# Patient Record
Sex: FEMALE | Race: WHITE | NOT HISPANIC OR LATINO | Employment: FULL TIME | URBAN - METROPOLITAN AREA
[De-identification: names, ages, dates, MRNs, and addresses within clinical notes are randomized per-mention and may not be internally consistent; named-entity substitution may affect disease eponyms.]

---

## 2025-02-16 ENCOUNTER — APPOINTMENT (OUTPATIENT)
Dept: RADIOLOGY | Facility: HOSPITAL | Age: 40
End: 2025-02-16

## 2025-02-16 ENCOUNTER — HOSPITAL ENCOUNTER (EMERGENCY)
Facility: HOSPITAL | Age: 40
Discharge: HOME | End: 2025-02-16
Attending: EMERGENCY MEDICINE

## 2025-02-16 VITALS
BODY MASS INDEX: 21 KG/M2 | OXYGEN SATURATION: 99 % | WEIGHT: 123 LBS | DIASTOLIC BLOOD PRESSURE: 80 MMHG | RESPIRATION RATE: 16 BRPM | TEMPERATURE: 97.3 F | SYSTOLIC BLOOD PRESSURE: 107 MMHG | HEIGHT: 64 IN | HEART RATE: 90 BPM

## 2025-02-16 DIAGNOSIS — J03.90 TONSILLITIS: Primary | ICD-10-CM

## 2025-02-16 LAB
ALBUMIN SERPL BCP-MCNC: 4.2 G/DL (ref 3.4–5)
ALP SERPL-CCNC: 54 U/L (ref 33–110)
ALT SERPL W P-5'-P-CCNC: 9 U/L (ref 7–45)
ANION GAP SERPL CALC-SCNC: 12 MMOL/L (ref 10–20)
AST SERPL W P-5'-P-CCNC: 11 U/L (ref 9–39)
BACTERIA BLD CULT: NORMAL
BACTERIA BLD CULT: NORMAL
BASOPHILS # BLD AUTO: 0.04 X10*3/UL (ref 0–0.1)
BASOPHILS NFR BLD AUTO: 0.2 %
BILIRUB SERPL-MCNC: 0.8 MG/DL (ref 0–1.2)
BUN SERPL-MCNC: 11 MG/DL (ref 6–23)
CALCIUM SERPL-MCNC: 9.3 MG/DL (ref 8.6–10.3)
CHLORIDE SERPL-SCNC: 103 MMOL/L (ref 98–107)
CO2 SERPL-SCNC: 26 MMOL/L (ref 21–32)
CREAT SERPL-MCNC: 0.64 MG/DL (ref 0.5–1.05)
CRP SERPL-MCNC: 21.91 MG/DL
EGFRCR SERPLBLD CKD-EPI 2021: >90 ML/MIN/1.73M*2
EOSINOPHIL # BLD AUTO: 0.07 X10*3/UL (ref 0–0.7)
EOSINOPHIL NFR BLD AUTO: 0.4 %
ERYTHROCYTE [DISTWIDTH] IN BLOOD BY AUTOMATED COUNT: 12 % (ref 11.5–14.5)
FLUAV RNA RESP QL NAA+PROBE: NOT DETECTED
FLUBV RNA RESP QL NAA+PROBE: NOT DETECTED
GLUCOSE SERPL-MCNC: 94 MG/DL (ref 74–99)
HCT VFR BLD AUTO: 40.4 % (ref 36–46)
HGB BLD-MCNC: 13.3 G/DL (ref 12–16)
IMM GRANULOCYTES # BLD AUTO: 0.08 X10*3/UL (ref 0–0.7)
IMM GRANULOCYTES NFR BLD AUTO: 0.5 % (ref 0–0.9)
LACTATE SERPL-SCNC: 0.7 MMOL/L (ref 0.4–2)
LYMPHOCYTES # BLD AUTO: 0.86 X10*3/UL (ref 1.2–4.8)
LYMPHOCYTES NFR BLD AUTO: 5.3 %
MCH RBC QN AUTO: 31.4 PG (ref 26–34)
MCHC RBC AUTO-ENTMCNC: 32.9 G/DL (ref 32–36)
MCV RBC AUTO: 96 FL (ref 80–100)
MONOCYTES # BLD AUTO: 1.27 X10*3/UL (ref 0.1–1)
MONOCYTES NFR BLD AUTO: 7.8 %
MONONUCLEOSIS SCREEN: NEGATIVE
NEUTROPHILS # BLD AUTO: 13.88 X10*3/UL (ref 1.2–7.7)
NEUTROPHILS NFR BLD AUTO: 85.8 %
NRBC BLD-RTO: 0 /100 WBCS (ref 0–0)
PLATELET # BLD AUTO: 321 X10*3/UL (ref 150–450)
POTASSIUM SERPL-SCNC: 3.7 MMOL/L (ref 3.5–5.3)
PROT SERPL-MCNC: 6.8 G/DL (ref 6.4–8.2)
RBC # BLD AUTO: 4.23 X10*6/UL (ref 4–5.2)
S PYO DNA THROAT QL NAA+PROBE: NOT DETECTED
SARS-COV-2 RNA RESP QL NAA+PROBE: NOT DETECTED
SODIUM SERPL-SCNC: 137 MMOL/L (ref 136–145)
WBC # BLD AUTO: 16.2 X10*3/UL (ref 4.4–11.3)

## 2025-02-16 PROCEDURE — 2500000004 HC RX 250 GENERAL PHARMACY W/ HCPCS (ALT 636 FOR OP/ED): Performed by: EMERGENCY MEDICINE

## 2025-02-16 PROCEDURE — 36415 COLL VENOUS BLD VENIPUNCTURE: CPT | Performed by: EMERGENCY MEDICINE

## 2025-02-16 PROCEDURE — 2500000005 HC RX 250 GENERAL PHARMACY W/O HCPCS: Performed by: EMERGENCY MEDICINE

## 2025-02-16 PROCEDURE — 99285 EMERGENCY DEPT VISIT HI MDM: CPT | Performed by: EMERGENCY MEDICINE

## 2025-02-16 PROCEDURE — 99222 1ST HOSP IP/OBS MODERATE 55: CPT | Performed by: OTOLARYNGOLOGY

## 2025-02-16 PROCEDURE — 96375 TX/PRO/DX INJ NEW DRUG ADDON: CPT | Mod: 59

## 2025-02-16 PROCEDURE — 83605 ASSAY OF LACTIC ACID: CPT | Performed by: EMERGENCY MEDICINE

## 2025-02-16 PROCEDURE — 86140 C-REACTIVE PROTEIN: CPT | Performed by: EMERGENCY MEDICINE

## 2025-02-16 PROCEDURE — 70491 CT SOFT TISSUE NECK W/DYE: CPT

## 2025-02-16 PROCEDURE — 96368 THER/DIAG CONCURRENT INF: CPT

## 2025-02-16 PROCEDURE — 2550000001 HC RX 255 CONTRASTS: Performed by: EMERGENCY MEDICINE

## 2025-02-16 PROCEDURE — 87040 BLOOD CULTURE FOR BACTERIA: CPT | Mod: STJLAB | Performed by: EMERGENCY MEDICINE

## 2025-02-16 PROCEDURE — 85025 COMPLETE CBC W/AUTO DIFF WBC: CPT | Performed by: EMERGENCY MEDICINE

## 2025-02-16 PROCEDURE — 80053 COMPREHEN METABOLIC PANEL: CPT | Performed by: EMERGENCY MEDICINE

## 2025-02-16 PROCEDURE — 87651 STREP A DNA AMP PROBE: CPT | Performed by: EMERGENCY MEDICINE

## 2025-02-16 PROCEDURE — 87636 SARSCOV2 & INF A&B AMP PRB: CPT | Performed by: EMERGENCY MEDICINE

## 2025-02-16 PROCEDURE — 96365 THER/PROPH/DIAG IV INF INIT: CPT | Mod: 59

## 2025-02-16 PROCEDURE — 70491 CT SOFT TISSUE NECK W/DYE: CPT | Performed by: RADIOLOGY

## 2025-02-16 PROCEDURE — 86308 HETEROPHILE ANTIBODY SCREEN: CPT | Performed by: EMERGENCY MEDICINE

## 2025-02-16 PROCEDURE — 96361 HYDRATE IV INFUSION ADD-ON: CPT

## 2025-02-16 RX ORDER — KETOROLAC TROMETHAMINE 15 MG/ML
15 INJECTION, SOLUTION INTRAMUSCULAR; INTRAVENOUS ONCE
Status: COMPLETED | OUTPATIENT
Start: 2025-02-16 | End: 2025-02-16

## 2025-02-16 RX ORDER — CLINDAMYCIN PHOSPHATE 900 MG/50ML
900 INJECTION, SOLUTION INTRAVENOUS ONCE
Status: COMPLETED | OUTPATIENT
Start: 2025-02-16 | End: 2025-02-16

## 2025-02-16 RX ORDER — LIDOCAINE HYDROCHLORIDE 20 MG/ML
15 SOLUTION OROPHARYNGEAL ONCE
Status: COMPLETED | OUTPATIENT
Start: 2025-02-16 | End: 2025-02-16

## 2025-02-16 RX ORDER — AMOXICILLIN AND CLAVULANATE POTASSIUM 875; 125 MG/1; MG/1
1 TABLET, FILM COATED ORAL EVERY 12 HOURS
Qty: 28 TABLET | Refills: 0 | Status: SHIPPED | OUTPATIENT
Start: 2025-02-16 | End: 2025-03-02

## 2025-02-16 RX ORDER — OXYCODONE HYDROCHLORIDE 5 MG/1
5 TABLET ORAL EVERY 6 HOURS PRN
Qty: 12 TABLET | Refills: 0 | Status: SHIPPED | OUTPATIENT
Start: 2025-02-16 | End: 2025-02-19

## 2025-02-16 RX ORDER — DEXTROAMPHETAMINE SACCHARATE, AMPHETAMINE ASPARTATE, DEXTROAMPHETAMINE SULFATE AND AMPHETAMINE SULFATE 5; 5; 5; 5 MG/1; MG/1; MG/1; MG/1
20 TABLET ORAL 2 TIMES DAILY
COMMUNITY

## 2025-02-16 RX ORDER — PREDNISONE 20 MG/1
TABLET ORAL
Qty: 15 TABLET | Refills: 0 | Status: SHIPPED | OUTPATIENT
Start: 2025-02-16 | End: 2025-02-28

## 2025-02-16 RX ADMIN — CLINDAMYCIN PHOSPHATE 900 MG: 900 INJECTION, SOLUTION INTRAVENOUS at 08:48

## 2025-02-16 RX ADMIN — LIDOCAINE HYDROCHLORIDE 15 ML: 20 SOLUTION ORAL at 09:45

## 2025-02-16 RX ADMIN — SODIUM CHLORIDE, POTASSIUM CHLORIDE, SODIUM LACTATE AND CALCIUM CHLORIDE 1000 ML: 600; 310; 30; 20 INJECTION, SOLUTION INTRAVENOUS at 08:49

## 2025-02-16 RX ADMIN — IOHEXOL 70 ML: 350 INJECTION, SOLUTION INTRAVENOUS at 08:27

## 2025-02-16 RX ADMIN — KETOROLAC TROMETHAMINE 15 MG: 15 INJECTION, SOLUTION INTRAMUSCULAR; INTRAVENOUS at 07:59

## 2025-02-16 RX ADMIN — DEXAMETHASONE SODIUM PHOSPHATE 20 MG: 10 INJECTION INTRAMUSCULAR; INTRAVENOUS at 08:49

## 2025-02-16 ASSESSMENT — PAIN SCALES - GENERAL
PAINLEVEL_OUTOF10: 2
PAINLEVEL_OUTOF10: 9

## 2025-02-16 ASSESSMENT — COLUMBIA-SUICIDE SEVERITY RATING SCALE - C-SSRS
6. HAVE YOU EVER DONE ANYTHING, STARTED TO DO ANYTHING, OR PREPARED TO DO ANYTHING TO END YOUR LIFE?: NO
1. IN THE PAST MONTH, HAVE YOU WISHED YOU WERE DEAD OR WISHED YOU COULD GO TO SLEEP AND NOT WAKE UP?: NO
2. HAVE YOU ACTUALLY HAD ANY THOUGHTS OF KILLING YOURSELF?: NO

## 2025-02-16 ASSESSMENT — PAIN DESCRIPTION - LOCATION
LOCATION: THROAT
LOCATION: THROAT

## 2025-02-16 ASSESSMENT — PAIN DESCRIPTION - PAIN TYPE: TYPE: ACUTE PAIN

## 2025-02-16 ASSESSMENT — PAIN - FUNCTIONAL ASSESSMENT
PAIN_FUNCTIONAL_ASSESSMENT: 0-10
PAIN_FUNCTIONAL_ASSESSMENT: 0-10

## 2025-02-16 ASSESSMENT — PAIN DESCRIPTION - DESCRIPTORS: DESCRIPTORS: SORE

## 2025-02-16 NOTE — ED PROVIDER NOTES
EMERGENCY DEPARTMENT ENCOUNTER      Pt Name: Eliana Lagunas  MRN: [id number]  Birthdate 1985  Date of evaluation: 2/16/2025  Provider: Mode Roland DO    CHIEF COMPLAINT       Chief Complaint   Patient presents with    Sore Throat     HISTORY OF PRESENT ILLNESS    Eliana Lagunas is a 40 y.o. year old female who presents to the ER for sore throat.  Reports that last week she was sneezing, Thursday started to have left-sided tonsil pain.  Thursday went to the dentist and had a post placed on the crown of one of her right upper incisors.  Has noticed increased swelling to the left side of her neck, endorses decreased appetite, pain swallowing, difficulty swallowing, nausea without vomiting, decreased urinary frequency with darkened urine.  She does report feeling like there is something underneath her tongue, and feels that she cannot open her mouth completely.  Also does report dysphonia.  Does endorse that she has been sweating frequently, feeling alternating hot and cold, however she has been taking Tylenol and ibuprofen alternating around-the-clock.  States that she has only voided once or twice per day for the last couple days.  Denies objective fever, chest pain, shortness of breath, abdominal pain.    PMH ADHD on Adderall, Ozempic, TRT for hot flashes  PSH tubal ligation, cholecystectomy  Allergies to sulfa  Endorses vaping use, alcohol use, denies drug use  PAST MEDICAL HISTORY   History reviewed. No pertinent past medical history.  CURRENT MEDICATIONS       Previous Medications    AMPHETAMINE-DEXTROAMPHETAMINE (ADDERALL) 20 MG TABLET    Take 1 tablet (20 mg) by mouth 2 times a day.     SURGICAL HISTORY     History reviewed. No pertinent surgical history.  ALLERGIES     Sulfamide  FAMILY HISTORY     No family history on file.  SOCIAL HISTORY       Social History     Tobacco Use    Smoking status: Never    Smokeless tobacco: Never   Vaping Use    Vaping status: Every Day    Substances:  Nicotine   Substance Use Topics    Alcohol use: Yes    Drug use: Never     PHYSICAL EXAM  (up to 7 for level 4, 8 or more for level 5)     ED Triage Vitals [02/16/25 0647]   Temperature Heart Rate Respirations BP   36.3 °C (97.3 °F) 100 18 118/69      Pulse Ox Temp Source Heart Rate Source Patient Position   100 % Temporal -- Sitting      BP Location FiO2 (%)     Right arm --       Physical Exam  Vitals and nursing note reviewed.   Constitutional:       General: She is not in acute distress.     Appearance: Normal appearance. She is not ill-appearing.   HENT:      Head: Normocephalic and atraumatic. No raccoon eyes, Newberry's sign, contusion or laceration.      Jaw: Trismus present. No malocclusion.      Comments: No edema floor of mouth     Right Ear: External ear normal.      Left Ear: External ear normal.      Mouth/Throat:      Mouth: Mucous membranes are moist. No oral lesions or angioedema.      Dentition: Normal dentition.      Pharynx: Uvula midline. Pharyngeal swelling, oropharyngeal exudate and posterior oropharyngeal erythema present. No uvula swelling.      Tonsils: Tonsillar exudate present.   Eyes:      Extraocular Movements: Extraocular movements intact.      Pupils: Pupils are equal, round, and reactive to light.   Neck:      Trachea: No tracheal deviation.      Comments: Pain with neck extension, no crepitus or fluctuance    No stridor, slight muffled phonation  Cardiovascular:      Rate and Rhythm: Normal rate and regular rhythm.      Pulses: Normal pulses.   Pulmonary:      Effort: No respiratory distress.      Breath sounds: No wheezing, rhonchi or rales.   Chest:      Chest wall: No tenderness.   Abdominal:      General: Abdomen is flat.      Palpations: Abdomen is soft. There is no mass.      Tenderness: There is no abdominal tenderness.   Musculoskeletal:         General: No tenderness or signs of injury.      Cervical back: Neck supple. No rigidity or crepitus. Pain with movement (extension)  "present. No muscular tenderness. Normal range of motion.      Right lower leg: No edema.      Left lower leg: No edema.   Lymphadenopathy:      Cervical: Cervical adenopathy present.      Left cervical: Posterior cervical adenopathy present.   Skin:     Coloration: Skin is not jaundiced or pale.      Findings: No petechiae, rash or wound.   Neurological:      Mental Status: She is alert.       DIAGNOSTIC RESULTS   LABS:  Labs Reviewed   GROUP A STREPTOCOCCUS, PCR - Normal       Result Value    Group A Strep PCR Not Detected     SARS-COV-2 AND INFLUENZA A/B PCR   CBC WITH AUTO DIFFERENTIAL   COMPREHENSIVE METABOLIC PANEL   C-REACTIVE PROTEIN   MONONUCLEOSIS SCREEN (HETEROPHILE ANTIBODY)     All other labs were within normal range or not returned as of this dictation.  Imaging  CT soft tissue neck w IV contrast    (Results Pending)      Procedure  Procedures  EMERGENCY DEPARTMENT COURSE/MDM:   Medical Decision Making    Vitals:    Vitals:    02/16/25 0647   BP: 118/69   BP Location: Right arm   Patient Position: Sitting   Pulse: 100   Resp: 18   Temp: 36.3 °C (97.3 °F)   TempSrc: Temporal   SpO2: 100%   Weight: 55.8 kg (123 lb)   Height: 1.62 m (5' 3.78\")     Eliana Lagunas is a female 40 y.o. who presents to the ER for ***. On arrival the patients vital signs were: {vital signs:38948}. History obtained from: {Historian:84475}.     EKG *** Rhythm: {CV DEVICE CHECK RHYTHM TYPE:38585} Ventricular rate: {Rate:60796} Intervals (-200 /QRS  /QT >450M or >470F): ME *** QRS *** Qtc *** Blocks: {heart blocks:11427} Potential signs of ischemia: {Signs of ischemia:08768} Mariscal-Modified Sgarbossa Criteria (if pacemaker or LBBB present):{modified sgarbossa:65866}    ED Course as of 02/16/25 0750   Sun Feb 16, 2025   0730 Group A Strep PCR: Not Detected [CB]      ED Course User Index  [CB] Mode Roland DO       Consultant discussions: ***  Diagnostic testing considered but not performed: ***  External " Records Reviewed: I reviewed recent and relevant outside records including inpatient notes, outpatient records  Prescription Drug Consideration: ***    Shared decision making for disposition  Patient and/or patient´s representative was counseled regarding labs, imaging, likely diagnosis. All questions were answered. Recommendation was made   for {Dispo:81743}    ED Medications administered this visit:    Medications   ketorolac (Toradol) injection 15 mg (has no administration in time range)   dexAMETHasone (Decadron) 20 mg in dextrose 5% 50 mL IV (has no administration in time range)       New Prescriptions from this visit:    New Prescriptions    No medications on file       Follow-up:  No follow-up provider specified.      Final Impression: No diagnosis found.      Please excuse any misspellings or unintended errors related to the Dragon speech recognition software used to dictate this note.    I reviewed the case with the attending ED physician. The attending ED physician agrees with the plan.      Procedure  Procedures  EMERGENCY DEPARTMENT COURSE/MDM:   Medical Decision Making    Vitals:    Vitals:    02/16/25 0930 02/16/25 0945 02/16/25 1000 02/16/25 1030   BP: 102/63  100/66 107/80   BP Location:       Patient Position:       Pulse:       Resp:       Temp:       TempSrc:       SpO2: 100% 100% 100% 99%   Weight:       Height:         Eliana Lagunas is a female 40 y.o. who presents to the ER for sore throat with associated neck swelling, pain with neck extension, dysphonia, trismus. On arrival the patients vital signs were: Afebrile, Tachycardic, Normotensive, Regular RR, and Normoxic on room air. History obtained from: patient.  No elevated floor of mouth, no stridor, dyspnea protecting her airway, does not appear to be Rinku's angina, emergent intubation not indicated.  Given trismus, dysphonia, tachycardia plan for CBC, CMP, CRP, Monospot, viral swabs, strep swab, CT soft tissue neck with IV contrast.  Will provide Decadron for anti-inflammation as well as Toradol and Xylocaine for analgesia..    ED Course as of 02/17/25 1056   Sun Feb 16, 2025   0730 Group A Strep PCR: Not Detected [CB]   0813 CBC and Auto Differential(!)  Leukocytosis with left shift, otherwise no acute leukocytosis, leukopenia, thrombocytosis, thrombocytopenia [CB]   0815 Given leukocytosis, heart rate greater than 90, neck infection will provide 1 L LR for rehydration, clindamycin for empiric intraoral antibiosis, escalate to septic workup including lactate, blood cultures [CB]   0824 C-Reactive Protein(!): 21.91  Significantly elevated [CB]   0825 Mononucleosis Screen: Negative [CB]   0825 Comprehensive metabolic panel  Normoglycemic, no acute electrolyte or hepatorenal abnormality [CB]   0825 Group A Strep PCR: Not Detected [CB]   0825 Coronavirus 2019, PCR: Not Detected [CB]   0825 Flu B Result: Not Detected [CB]   0825 Flu A Result: Not Detected [CB]   0912 CT soft tissue neck w IV contrast  Characteristic findings of  acute tonsillitis with moderate-to-marked  enlargement of the bilateral palatine tonsils and moderate  enlargement of the lingual tonsils bilaterally. Tonsillar enlargement  results in an element of transverse narrowing of the oropharyngeal  airway with airway patency. There is questionable subtle tiny  phlegmonous component along the inferior margin of the left palatine  tonsil with no organized or drainable fluid collection evident. Soft  tissue inflammation extends to the left parapharyngeal fat and left  submandibular space.      Enlarged, reactive appearing bilateral cervical lymph nodes. [CB]   1000 On reassessment normotensive, heart rate improved [CB]   1017 Discussed with ENT, Dr. Kang, who acknowledged the patient could either be admitted or discharged based off of her current condition.  If discharged, would send home with Augmentin and prednisone taper.  Discussed with patient, she prefers to go home if possible.  Plan for p.o. challenge for disposition. If fails PO, will admit on unsayn. [CB]   1049 Patient reevaluated.  Patient tolerating oral intake.  She wants to try outpatient management with I think is completely reasonable at this time.  Will start her on Augmentin and continued steroids. [JJ]      ED Course User Index  [CB] Mode Roland DO  [JJ] Andrea Morales DO         Diagnoses as of 02/17/25 1056   Tonsillitis     External Records Reviewed: I reviewed recent and relevant outside records including inpatient notes, outpatient records  Prescription Drug Consideration: Augmentin prescribed for tonsillitis, prednisone prescribed for anti-inflammation, oxycodone prescribed for breakthrough pain    Shared decision making for disposition  Patient and/or patient´s representative was counseled regarding labs, imaging, likely diagnosis. All questions were answered. Recommendation was made   for discharge home. The patient agreed and was discharged home in stable condition with appropriate relevant  educational materials. Return precautions were provided which included fever of 100.4 (38C) or higher that does not respond to acetaminophen or ibuprofen, persistent vomiting, inability to open your jaw, hot potato voice, stridor, difficulty with physically swallowing, difficulty breathing, chest pain, or worsening of your current symptoms.     ED Medications administered this visit:    Medications   ketorolac (Toradol) injection 15 mg (15 mg intravenous Given 2/16/25 0399)   dexAMETHasone (Decadron) 20 mg in dextrose 5% 50 mL IV (0 mg intravenous Stopped 2/16/25 0948)   clindamycin (Cleocin) 900 mg in dextrose 5% IV 50 mL (0 mg intravenous Stopped 2/16/25 0948)   lactated Ringer's bolus 1,000 mL (0 mL intravenous Stopped 2/16/25 1124)   iohexol (OMNIPaque) 350 mg iodine/mL solution 70 mL (70 mL intravenous Given 2/16/25 0864)   lidocaine (Xylocaine) 2 % mouth solution 15 mL (15 mL oral Given 2/16/25 0921)       New Prescriptions from this visit:    Discharge Medication List as of 2/16/2025 11:18 AM        START taking these medications    Details   amoxicillin-pot clavulanate (Augmentin) 875-125 mg tablet Take 1 tablet by mouth every 12 hours for 14 days., Starting Sun 2/16/2025, Until Sun 3/2/2025, Print      oxyCODONE (Roxicodone) 5 mg immediate release tablet Take 1 tablet (5 mg) by mouth every 6 hours if needed (for breakthrough pain) for up to 3 days., Starting Sun 2/16/2025, Until Wed 2/19/2025 at 2359, Normal      predniSONE (Deltasone) 20 mg tablet Multiple Dosages:Starting Sun 2/16/2025, Until Tue 2/18/2025 at 2359, THEN Starting Wed 2/19/2025, Until Fri 2/21/2025 at 2359, THEN Starting Sat 2/22/2025, Until Mon 2/24/2025 at 2359, THEN Starting Tue 2/25/2025, Until Thu 2/27/2025 at 2359Take 2  tablets (40 mg) by mouth once daily for 3 days, THEN 1.5 tablets (30 mg) once daily for 3 days, THEN 1 tablet (20 mg) once daily for 3 days, THEN 0.5 tablets (10 mg) once daily for 3 days., Print              Follow-up:  Glenn Kang MD  [address]  [facility], [address]  [place] [address]  [phone]              Final Impression:   1. Tonsillitis          Please excuse any misspellings or unintended errors related to the Dragon speech recognition software used to dictate this note.    I reviewed the case with the attending ED physician. The attending ED physician agrees with the plan.      Mode Roland,   Resident  02/17/25 1055

## 2025-02-16 NOTE — ED TRIAGE NOTES
Pt states she noticed throat hurting on Friday at work. Pt states throughout Saturday pain and swelling increased.

## 2025-02-16 NOTE — CONSULTS
Reason For Consult  Severe tonsillitis with possible peritonsillar abscess    History Of Present Illness  Eliana Lagunas is a 40 y.o. female presenting with severe tonsillitis over the last several days.  Patient got significantly worse and was brought to the emergency room today.  Evaluation in the emergency room including CT scan was performed showing prominent tonsils but without cathy abscess.  She also has prominent lingual tonsil tissue as well.  She has already been given 20 of Decadron.  The remaining ENT inquiry is otherwise grossly clear.     Past Medical History  She has no past medical history on file.    Surgical History  Surgical history noted for tubal ligation and cholecystectomy.    Social History  She reports that she has never smoked. She has never used smokeless tobacco. She reports current alcohol use. She reports that she does not use drugs.    Family History  No family history on file.     Allergies  Sulfamide    Review of Systems  Review of systems is negative for any active cardiopulmonary symptomatology.  No worrisome GI issues.     Physical Exam  General appearance: No acute distress. Normal facies. Symmetric facial movement. No gross lesions of the face are noted.  The external ear structures appear normal. The ear canals patent and the tympanic membranes are intact without evidence of air-fluid levels, retraction, or congenital defects.  Anterior rhinoscopy notes essentially a midline nasal septum. Examination is noted for normal healthy mucosal membranes without any evidence of lesions, polyps, or exudate. The tongue is normally mobile. There are no lesions on the gingiva, buccal, or oral mucosa. There are no oral cavity masses.  Patient does have evidence of exudative tonsillitis left greater than right.  The neck is negative for mass but does show evidence of bilateral cervical lymphadenopathy.  The trachea and parotid are clear. The thyroid bed is grossly unremarkable. The  "salivary gland structures are grossly unremarkable.     Last Recorded Vitals  Blood pressure 102/63, pulse 90, temperature 36.3 °C (97.3 °F), temperature source Temporal, resp. rate 16, height 1.62 m (5' 3.78\"), weight 55.8 kg (123 lb), SpO2 100%.    Relevant Results      CT scanning was reviewed by me personally showing evidence of prominent tonsils without cathy abscess as noted.     Assessment/Plan     Severe exudative tonsillitis.  Patient has associated reactive lymphadenopathy.  At this juncture the 2 opportunities for management include hospitalization with IV antibiotics and steroids or potentially discharge home for the emergency room today on oral antibiotics and steroids.  If she goes home I would recommend utilization of Augmentin 875 mg twice per day for 10 days in conjunction with prednisone 40 mg a day for 3 days then 20 mg a day for 2 days.  If she stays in the hospital, I would recommend Unasyn 3 3 g every 6 hours in conjunction with Decadron 10 mg every 6 hours.  If she does great she may go home at any point.  If things get worse obviously please contact me accordingly.  This has been discussed with Dr. Morales as well as the patient.    All questions were answered in this regard accordingly.      Thank you again for allowing us to participate in the care of this patient.    This note was created with voice recognition software and has not been corrected for typographical or grammatical errors.        Glenn Kang MD    " 4 = No change - symptoms remain essentially unchanged

## 2025-02-20 LAB
BACTERIA BLD CULT: NORMAL
BACTERIA BLD CULT: NORMAL

## 2025-02-21 ENCOUNTER — APPOINTMENT (OUTPATIENT)
Dept: OTOLARYNGOLOGY | Facility: CLINIC | Age: 40
End: 2025-02-21